# Patient Record
Sex: FEMALE | Race: WHITE
[De-identification: names, ages, dates, MRNs, and addresses within clinical notes are randomized per-mention and may not be internally consistent; named-entity substitution may affect disease eponyms.]

---

## 2019-12-05 ENCOUNTER — HOSPITAL ENCOUNTER (OUTPATIENT)
Dept: HOSPITAL 38 - CC.SDS | Age: 44
LOS: 1 days | Discharge: HOME | End: 2019-12-06
Payer: MEDICAID

## 2019-12-05 DIAGNOSIS — F41.9: ICD-10-CM

## 2019-12-05 DIAGNOSIS — F17.210: ICD-10-CM

## 2019-12-05 DIAGNOSIS — Z88.2: ICD-10-CM

## 2019-12-05 DIAGNOSIS — G43.909: ICD-10-CM

## 2019-12-05 DIAGNOSIS — E78.2: ICD-10-CM

## 2019-12-05 DIAGNOSIS — K43.9: Primary | ICD-10-CM

## 2019-12-05 DIAGNOSIS — Z79.899: ICD-10-CM

## 2019-12-05 DIAGNOSIS — F32.9: ICD-10-CM

## 2019-12-05 NOTE — OR
DATE OF OPERATION:  12/05/2019

 

PREOPERATIVE DIAGNOSIS:  VENTRAL HERNIA.

 

POSTOPERATIVE DIAGNOSIS:  VENTRAL HERNIA.

 

SURGEON:  Hay Andrews MD

 

PROCEDURE:  OPEN REPAIR OF VENTRAL HERNIA.

 

ANESTHESIA:  General.

 

ESTIMATED BLOOD LOSS:  Minimum.

 

SPECIMEN:  None.

 

FINDINGS:  A 3 cm ventral defect just superior to the umbilicus, consistent with

a ventral hernia.  The hernia sac contents contained omentum.

 

INDICATIONS:  This 44-year-old female has a symptomatic ventral hernia.

 

DESCRIPTION OF PROCEDURE:  After adequate preparation, a longitudinal incision

was made over the bulge area superior to the umbilicus.  This was carried down

to the hernia sac.  This was identified and the hernia sac dissected free from

the surrounding subcutaneous tissue.  I did have to incise the wound slightly

and free it up from the fascial margins to be able to reduce the omentum.  This

was able to be reduced without difficulty then.  The wound was closed in a

transverse fashion using double-stranded #1 PDS for buttress sutures and

permanent sutures were 0 prolene in a transverse fashion also.  This seemed to

adequately close this.  She does have thin tissue, but I decided not to use

mesh.  The muscular layer and linea alba were infiltrated with 0.5% lidocaine

with epinephrine.  The subcutaneous tissue was closed with a 2-0 Vicryl and a 4-

0 Vicryl for the skin.

 

BPB/MODL

DD:  12/05/2019 09:03:49

DT:  12/05/2019 11:28:48

Job #:  [id number]

## 2019-12-06 RX ADMIN — OXYCODONE HYDROCHLORIDE AND ACETAMINOPHEN PRN TAB: 5; 325 TABLET ORAL at 06:04

## 2019-12-06 RX ADMIN — OXYCODONE HYDROCHLORIDE AND ACETAMINOPHEN PRN TAB: 5; 325 TABLET ORAL at 01:52

## 2019-12-06 NOTE — PCM.SN
- Free Text/Narrative


Note: 





Stable POD #1.  Pain controlled.  Wound clean and dry.  Regular diet tolerated.

  Ambulated without assistance.  Instructions given.  MIRIAM Lacy as needed.  

Percocet #20 given for pain.  Can discharge today.

## 2020-07-19 ENCOUNTER — HOSPITAL ENCOUNTER (EMERGENCY)
Dept: HOSPITAL 38 - CC.ED | Age: 45
Discharge: HOME | End: 2020-07-19
Payer: MEDICAID

## 2020-07-19 DIAGNOSIS — G43.909: Primary | ICD-10-CM

## 2020-07-19 DIAGNOSIS — F17.210: ICD-10-CM

## 2020-07-19 DIAGNOSIS — F41.9: ICD-10-CM

## 2020-07-19 DIAGNOSIS — Z79.899: ICD-10-CM

## 2020-07-19 DIAGNOSIS — Z88.2: ICD-10-CM

## 2020-07-19 DIAGNOSIS — E78.00: ICD-10-CM

## 2020-07-19 NOTE — EDM.PDOC
ED HPI GENERAL MEDICAL PROBLEM





- General


Chief Complaint: General


Stated Complaint: migraine


Time Seen by Provider: 20 18:48


Source of Information: Reports: Patient


History Limitations: Reports: No Limitations





- History of Present Illness


INITIAL COMMENTS - FREE TEXT/NARRATIVE: 





Patient to the emergency department where she advises she has had a migraine for

the past hour and a half.  The patient advised that she is out of her normal 

migraine medicine.  The patient advises that she did take a Fioricet without 

relief of her symptoms.  Denies any change in vision she denies any ear, nose, 

throat symptoms denies any chest pain pressure heaviness denies any palpitations

irregular heartbeat she has had some nausea no vomiting no diarrhea denies any 

abdominal pain denies any calf pain redness or swelling.  The patient advises 

again that this is her normal progression of migraine.


Onset: Today


Duration: Hour(s):


Location: Reports: Head


Quality: Reports: Ache


Severity: Moderate


Improves with: Reports: None


Worsens with: Reports: None


Associated Symptoms: Reports: Headaches, Nausea/Vomiting (Nausea without 

vomiting).  Denies: Shortness of Breath


Treatments PTA: Reports: Other (see below) (Fioricet)


  ** Headache


Pain Score (Numeric/FACES): 8





- Related Data


                                    Allergies











Allergy/AdvReac Type Severity Reaction Status Date / Time


 


sulfamethoxazole Allergy  Renal Verified 20 18:39





[From Bactrim]   Insufficiency  


 


trimethoprim [From Bactrim] Allergy  Renal Verified 20 18:39





   Insufficiency  











Home Meds: 


                                    Home Meds





Butalb/Acetaminophen/Caffeine [Xahqql-Pvaeqyzh-Ehsg -40] 1 - 2 tab PO TID 

PRN 19 [History]


Cyclobenzaprine HCl 10 mg PO DAILY 19 [History]


LORazepam 1 mg PO DAILY 19 [History]


Nabumetone [Relafen Ds] 500 mg PO DAILY 19 [History]


Sertraline [Zoloft] 150 mg PO DAILY 19 [History]


Topiramate [Topamax] 100 mg PO DAILY 19 [History]


atorvaSTATin Calcium [Atorvastatin Calcium] 20 mg PO DAILY 19 [History]


busPIRone HCl [Buspirone HCl] 15 mg PO BID 19 [History]











Past Medical History


Cardiovascular History: Reports: High Cholesterol


Respiratory History: Reports: Other (See Below)


Other Respiratory History: SEASONAL ALLERGIES


Gastrointestinal History: Reports: Other (See Below)


Other Gastrointestinal History: UMBILICAL HERNIA


Genitourinary History: Reports: None


OB/GYN History: Reports: Pregnancy


Musculoskeletal History: Reports: Back Pain, Chronic


Neurological History: Reports: Migraines


Psychiatric History: Reports: Anxiety, Dementia





- Infectious Disease History


Infectious Disease History: Reports: Chicken Pox





- Past Surgical History


Cardiovascular Surgical History: Reports: None


Respiratory Surgical History: Reports: None


GI Surgical History: Reports: Cholecystectomy


Female  Surgical History: Reports:  Section


Neurological Surgical History: Reports: None


Musculoskeletal Surgical History: Reports: None





Social & Family History





- Family History


Family Medical History: Noncontributory





- Tobacco Use


Smoking Status *Q: Current Every Day Smoker


Years of Tobacco use: 30


Packs/Tins Daily: 1





- Caffeine Use


Caffeine Use: Reports: Coffee, Soda





- Recreational Drug Use


Recreational Drug Use: No





ED ROS GENERAL





- Review of Systems


Review Of Systems: See Below


Constitutional: Reports: No Symptoms


HEENT: Reports: No Symptoms


Respiratory: Reports: No Symptoms


Cardiovascular: Reports: No Symptoms.  Denies: Chest Pain, Claudication, 

Palpitations


Endocrine: Reports: No Symptoms


GI/Abdominal: Reports: No Symptoms, Nausea.  Denies: Abdominal Pain, Vomiting


: Reports: No Symptoms


Musculoskeletal: Reports: No Symptoms.  Denies: Neck Pain, Back Pain


Skin: Reports: No Symptoms.  Denies: Rash, Erythema


Neurological: Reports: Headache.  Denies: Confusion, Dizziness, Numbness, 

Paresthesia, Tingling, Trouble Speaking, Difficulty Walking, Weakness, Change in

 Speech, Gait Disturbance


Psychiatric: Reports: No Symptoms





ED EXAM, GENERAL





- Physical Exam


Exam: See Below


Exam Limited By: No Limitations


General Appearance: Alert, WD/WN, No Apparent Distress


Ears: Normal External Exam


Nose: Normal Inspection


Throat/Mouth: Normal Inspection, Normal Voice, No Airway Compromise


Head: Atraumatic, Normocephalic


Neck: Normal Inspection, Supple, Non-Tender, Full Range of Motion


Respiratory/Chest: No Respiratory Distress, Lungs Clear, Normal Breath Sounds, 

Chest Non-Tender


Cardiovascular: Normal Peripheral Pulses, Regular Rate, Rhythm, No Murmur


Peripheral Pulses: 2+: Radial (L), Radial (R)


GI/Abdominal: Soft, Non-Tender


Back Exam: Normal Inspection, Full Range of Motion


Extremities: Normal Inspection, Normal Range of Motion, Non-Tender, Normal 

Capillary Refill.  No: Leg Pain


Neurological: Alert, Oriented, CN II-XII Intact, Normal Cognition, Normal Gait, 

No Motor/Sensory Deficits


Psychiatric: Normal Affect, Normal Mood


Skin Exam: Warm, Dry, Intact, Normal Color





Course





- Vital Signs


Text/Narrative:: 





The patient was evaluated in the emergency department, the patient was given a 

typical migraine cocktail.  The patient has had some relief from this headache  

with the treatment plan.  The patient be discharged home she will be advised to 

refill her medication as she advises that she will be able to do this tomorrow. 

 She is advised to take the medication as prescribed.  She is advised to return 

emergency department sooner if worse or any problems she is also advised to 

follow-up the family doctor as needed


Last Recorded V/S: 


                                Last Vital Signs











Temp  37.0 C   20 18:38


 


Pulse  104 H  20 18:38


 


Resp  18   20 18:38


 


BP  130/71   20 18:38


 


Pulse Ox  99   20 18:38














- Orders/Labs/Meds


Meds: 


Medications














Discontinued Medications














Generic Name Dose Route Start Last Admin





  Trade Name Alex  PRN Reason Stop Dose Admin


 


Diphenhydramine HCl  25 mg  20 18:50  20 19:04





  Benadryl  IVUSH  20 18:51  25 mg





  ONETIME ONE   Administration


 


Ketorolac Tromethamine  30 mg  20 18:51  20 19:15





  Toradol  IVPUSH  20 18:52  30 mg





  ONETIME ONE   Administration


 


Metoclopramide HCl  10 mg  20 18:50  20 19:09





  Reglan  IVPUSH  20 18:51  10 mg





  ONETIME ONE   Administration














Departure





- Departure


Time of Disposition: 19:25


Disposition: Home, Self-Care 01


Condition: Good


Clinical Impression: 


Migraine headache


Qualifiers:


 Migraine type: unspecified Intractability: not intractable 








- Discharge Information


*PRESCRIPTION DRUG MONITORING PROGRAM REVIEWED*: Not Applicable


*COPY OF PRESCRIPTION DRUG MONITORING REPORT IN PATIENT MYRIAM: Not Applicable


Instructions:  Recurrent Migraine Headache, Easy-to-Read


Forms:  ED Department Discharge


Additional Instructions: 


 your medication from the pharmacy as discussed tomorrow


Take all your medication as prescribed


Follow-up with your family doctor as needed


Return to the emergency department as needed





Sepsis Event Note (ED)





- Evaluation


Sepsis Screening Result: No Definite Risk





- Focused Exam


Vital Signs: 


                                   Vital Signs











  Temp Pulse Resp BP Pulse Ox


 


 20 18:38  37.0 C  104 H  18  130/71  99














- Problem List & Annotations


(1) Migraine headache


SNOMED Code(s): 28270752


   Code(s): G43.909 - MIGRAINE, UNSP, NOT INTRACTABLE, WITHOUT STATUS 

MIGRAINOSUS   Status: Acute   Priority: Medium   Current Visit: No   


Qualifiers: 


   Migraine type: unspecified   Intractability: not intractable 





- Problem List Review


Problem List Initiated/Reviewed/Updated: Yes





- Assessment/Plan


Plan: 





The patient's past medical history, past surgical history, social history and 

past family medical history was reviewed see the nursing notes for complete 

details

## 2020-11-15 ENCOUNTER — HOSPITAL ENCOUNTER (EMERGENCY)
Dept: HOSPITAL 38 - CC.ED | Age: 45
Discharge: HOME | End: 2020-11-15
Payer: MEDICAID

## 2020-11-15 DIAGNOSIS — A08.4: ICD-10-CM

## 2020-11-15 DIAGNOSIS — E78.00: ICD-10-CM

## 2020-11-15 DIAGNOSIS — Z88.1: ICD-10-CM

## 2020-11-15 DIAGNOSIS — Z79.899: ICD-10-CM

## 2020-11-15 DIAGNOSIS — J01.10: Primary | ICD-10-CM

## 2020-11-15 DIAGNOSIS — Z88.2: ICD-10-CM

## 2020-11-15 DIAGNOSIS — F41.9: ICD-10-CM

## 2020-11-15 DIAGNOSIS — F17.210: ICD-10-CM

## 2020-11-15 DIAGNOSIS — F32.9: ICD-10-CM

## 2020-11-15 DIAGNOSIS — B37.9: ICD-10-CM

## 2020-11-15 DIAGNOSIS — Z20.828: ICD-10-CM

## 2020-11-15 LAB
APTT PPP: 23.2 SEC (ref 23.2–32.3)
CHLORIDE SERPL-SCNC: 102 MEQ/L (ref 98–106)
SODIUM SERPL-SCNC: 138 MEQ/L (ref 136–145)

## 2020-11-15 PROCEDURE — U0002 COVID-19 LAB TEST NON-CDC: HCPCS

## 2020-11-15 NOTE — EDM.PDOC
ED HPI GENERAL MEDICAL PROBLEM





- General


Chief Complaint: Gastrointestinal Problem


Stated Complaint: vomiting


Time Seen by Provider: 11/15/20 14:58


Source of Information: Reports: Patient


History Limitations: Reports: No Limitations





- History of Present Illness


INITIAL COMMENTS - FREE TEXT/NARRATIVE: 





This patient is a 45 year old female that presents to the ER. Patient reports 

that for about 3 weeks having a productive cough. She reports that she also has 

had frontal headache on and off again for 3 weeks. Patient reports that it feels

like a sinus headache with pressure in her sinus. Patient reports then yesterday

she started with nausea and vomiting. She reports she vomiting a lot yesterday. 

She reports today she has vomited 3 times. She reports that she is still a lit

tle nauseated, but is feeling a little better today from the vomiting. She 

reports she was going to go to work today, but was told she needed a doctors 

note to return to work, so this is reason for coming to the ER today. 


Onset Date: 20


Duration: Day(s): (2)


Severity: Mild


Improves with: Reports: None


Worsens with: Reports: None


Associated Symptoms: Reports: Confusion, Chest Pain, Cough, cough w sputum, 

Headaches, Nausea/Vomiting.  Denies: Diaphoresis, Fever/Chills, Loss of 

Appetite, Malaise, Rash, Seizure, Shortness of Breath, Syncope, Weakness


  ** Chest


Pain Score (Numeric/FACES): 3





- Related Data


                                    Allergies











Allergy/AdvReac Type Severity Reaction Status Date / Time


 


sulfamethoxazole Allergy  Renal Verified 11/15/20 14:34





[From Bactrim]   Insufficiency  


 


trimethoprim [From Bactrim] Allergy  Renal Verified 11/15/20 14:34





   Insufficiency  











Home Meds: 


                                    Home Meds





Butalb/Acetaminophen/Caffeine [Ocshtj-Jdufmqdb-Yvxd -40] 1 - 2 tab PO TID 

PRN 19 [History]


Cyclobenzaprine HCl 10 mg PO DAILY 19 [History]


LORazepam 1 mg PO DAILY PRN 19 [History]


Sertraline [Zoloft] 150 mg PO DAILY 19 [History]


Topiramate [Topamax] 100 mg PO DAILY 19 [History]


atorvaSTATin Calcium [Atorvastatin Calcium] 40 mg PO DAILY 19 [History]


busPIRone HCl [Buspirone HCl] 15 mg PO BID 19 [History]


Cefdinir [Omnicef] 300 mg PO BID 10 Days #20 cap 11/15/20 [Rx]


Fluconazole [Diflucan] 150 mg PO Q72H #2 tablet 11/15/20 [Rx]


Ondansetron [Zofran ODT] 4 mg PO Q6H PRN #12 tab.dis 11/15/20 [Rx]











Past Medical History


Cardiovascular History: Reports: High Cholesterol


Respiratory History: Reports: Other (See Below)


Other Respiratory History: SEASONAL ALLERGIES


Gastrointestinal History: Reports: Other (See Below)


Other Gastrointestinal History: UMBILICAL HERNIA


Genitourinary History: Reports: None


OB/GYN History: Reports: Pregnancy


Musculoskeletal History: Reports: Back Pain, Chronic


Neurological History: Reports: Migraines


Psychiatric History: Reports: Anxiety, Depression





- Infectious Disease History


Infectious Disease History: Reports: Chicken Pox





- Past Surgical History


Cardiovascular Surgical History: Reports: None


Respiratory Surgical History: Reports: None


GI Surgical History: Reports: Cholecystectomy, Hernia Repair/Other


Female  Surgical History: Reports:  Section


Neurological Surgical History: Reports: None


Musculoskeletal Surgical History: Reports: None





Social & Family History





- Family History


Family Medical History: No Pertinent Family History





- Tobacco Use


Tobacco Use Status *Q: Current Every Day Tobacco User


Years of Tobacco use: 30


Packs/Tins Daily: 0.5





- Caffeine Use


Caffeine Use: Reports: Coffee, Energy Drinks, Soda





- Recreational Drug Use


Recreational Drug Use: No





ED ROS GENERAL





- Review of Systems


Review Of Systems: See Below


Constitutional: Reports: No Symptoms


HEENT: Reports: Rhinitis, Sinus Problem


Respiratory: Reports: Pleuritic Chest Pain (pain when coughing and taking big 

deep breaths), Cough, Sputum.  Denies: Shortness of Breath


Cardiovascular: Denies: Chest Pain (pain is with taking big deep breaths and 

coughing. )


Endocrine: Reports: No Symptoms


GI/Abdominal: Reports: Nausea, Vomiting.  Denies: Abdominal Pain, Constipation, 

Diarrhea


: Reports: No Symptoms


Musculoskeletal: Reports: No Symptoms


Skin: Reports: No Symptoms


Neurological: Reports: Headache (frontal. Sinus Pressure)


Psychiatric: Reports: No Symptoms


Hematologic/Lymphatic: Reports: No Symptoms


Immunologic: Reports: No Symptoms





ED EXAM, GENERAL





- Physical Exam


Exam: See Below


Exam Limited By: No Limitations


General Appearance: Alert, WD/WN, No Apparent Distress


Eye Exam: Bilateral Eye: Normal Inspection, PERRL


Ears: Normal External Exam, Normal Canal, Hearing Grossly Normal, Normal TMs


Ear Exam: Bilateral Ear: Auricle Normal, Canal Normal, TM normal


Nose: Normal Inspection, Normal Mucosa, No Blood


Throat/Mouth: Normal Inspection, Normal Lips, Normal Teeth, Normal Gums, Normal 

Oropharynx, Normal Voice, No Airway Compromise


Head: Atraumatic, Normocephalic, Sinus Tenderness (frontal), Other (Frontal 

sinus failed light illumination)


Neck: Normal Inspection, Supple, Non-Tender, Full Range of Motion


Respiratory/Chest: No Respiratory Distress, Lungs Clear, Normal Breath Sounds, 

No Accessory Muscle Use, Chest Non-Tender


Cardiovascular: Normal Peripheral Pulses, Regular Rate, Rhythm, No Edema, No 

Gallop, No JVD, No Murmur, No Rub


Peripheral Pulses: 2+: Radial (L), Radial (R), Posterior Tibial (L), Posterior 

Tibial (R)


GI/Abdominal: Normal Bowel Sounds, Soft, Non-Tender, No Organomegaly, No 

Distention, No Abnormal Bruit, No Mass, Pelvis Stable


Back Exam: Normal Inspection, Full Range of Motion.  No: CVA Tenderness (L), CVA

 Tenderness (R)


Extremities: Normal Inspection, Normal Range of Motion, Non-Tender, No Pedal 

Edema, Normal Capillary Refill


Neurological: Alert, Oriented, Normal Cognition, Normal Gait, No Motor/Sensory 

Deficits


Psychiatric: Normal Affect, Normal Mood


Skin Exam: Warm, Dry, Intact, Normal Color, No Rash


Lymphatic: No Adenopathy


  ** #1 Interpretation


EKG Date: 11/15/20


Time: 14:54


Rhythm: NSR


Rate (Beats/Min): 96


Axis: Normal


P-Wave: Present


QRS: Normal


ST-T: Normal


QT: Normal


Comparison: NA - No Prior EKG





Course





- Vital Signs


Last Recorded V/S: 


                                Last Vital Signs











Temp  98 F   11/15/20 14:30


 


Pulse  102 H  11/15/20 14:30


 


Resp  18   11/15/20 14:30


 


BP  157/89 H  11/15/20 14:30


 


Pulse Ox  98   11/15/20 14:30














- Orders/Labs/Meds


Orders: 


                               Active Orders 24 hr











 Category Date Time Status


 


 EKG Documentation Completion [RC] STAT Care  11/15/20 14:43 Active


 


 Chest 2V [CR] Stat Exams  11/15/20 14:43 Taken











Labs: 


                                Laboratory Tests











  11/15/20 11/15/20 11/15/20 Range/Units





  14:43 14:45 14:45 


 


WBC   10.8 H   (5.0-10.0)  10^3/uL


 


RBC   5.07   (4.00-5.50)  10^6/uL


 


Hgb   15.6   (12.0-16.0)  g/dL


 


Hct   48.6 H   (37.0-47.0)  %


 


MCV   95.9 H   (82.0-94.0)  fL


 


MCH   30.8   (27.0-32.0)  pg


 


MCHC   32.1 L   (33.0-38.0)  g/dL


 


RDW Coeff of Jocy   15.0   (11.0-15.0)  %


 


Plt Count   305   (150-400)  10^3/uL


 


Neut % (Auto)   65.1   (35-85)  %


 


Lymph % (Auto)   26.1   (10-55)  %


 


Mono % (Auto)   5.2   (0-16)  %


 


Eos % (Auto)   3.0   (0-5)  %


 


Baso % (Auto)   0.6   (0-3)  %


 


Neut # (Auto)   7.05 H   (1.80-7.00)  10^3/uL


 


Lymph # (Auto)   2.82   (1.00-4.80)  10^3/uL


 


Mono # (Auto)   0.56   (0.00-0.80)  10^3/uL


 


Eos # (Auto)   0.32   (0.00-0.45)  10^3/uL


 


Baso # (Auto)   0.06   10^3/uL


 


PT    10.0  (9.7-12.3)  SEC


 


INR    0.99  (0.92-1.18)  


 


APTT    23.2  (23.2-32.3)  SEC


 


Sodium     (136-145)  mEq/L


 


Potassium     (3.5-5.0)  mEq/L


 


Chloride     ()  mEq/L


 


Carbon Dioxide     (21-32)  mmol/L


 


BUN     (7-18)  mg/dL


 


Creatinine     (0.6-1.0)  mg/dL


 


Est Cr Clr Drug Dosing     mL/min


 


Estimated GFR (MDRD)     (>=60)  mL/min


 


Glucose     (75-99)  mg/dL


 


Lactic Acid     (0.4-2.0)  mmol/L


 


Calcium     (8.4-10.1)  mg/dL


 


Total Bilirubin     (0.0-1.0)  mg/dL


 


AST     (15-37)  U/L


 


ALT     (12-78)  U/L


 


Alkaline Phosphatase     ()  U/L


 


Lactate Dehydrogenase     (100-190)  U/L


 


Creatine Kinase     ()  U/L


 


Troponin I     (0.00-0.06)  ng/mL


 


NT-Pro-B Natriuret Pep     (0-1000)  pg/mL


 


Total Protein     (6.4-8.2)  g/dL


 


Albumin     (3.4-5.0)  g/dL


 


Amylase     ()  U/L


 


Lipase     ()  U/L


 


Urine Color     (YELLOW)  


 


Urine Appearance     (CLEAR)  


 


Urine pH     (4.5-8.0)  


 


Ur Specific Gravity     (1.003-1.020)  


 


Urine Protein     (NEGATIVE)  mg/dL


 


Urine Glucose (UA)     (NEGATIVE)  mg/dL


 


Urine Ketones     (NEGATIVE)  mg/dL


 


Urine Occult Blood     (NEGATIVE)  


 


Urine Nitrite     (NEGATIVE)  


 


Urine Bilirubin     (NEGATIVE)  


 


Urine Urobilinogen     (0.2-1.0)  EU/dL


 


Ur Leukocyte Esterase     (NEGATIVE)  


 


Urine RBC     (0-5)  /HPF


 


Urine WBC     (0-5)  /HPF


 


Ur Epithelial Cells     (NOT SEEN)  /HPF


 


Urine Bacteria     (NOT SEEN)  /HPF


 


Urine Mucus     (NOT SEEN)  /HPF


 


Urine Yeast     (NOT SEEN)  /HPF


 


Urinalysis Comment     


 


SARS CoV-2 RNA Rapid EVELINA  Negative    (NEGATIVE)  














  11/15/20 11/15/20 11/15/20 Range/Units





  14:45 14:45 15:09 


 


WBC     (5.0-10.0)  10^3/uL


 


RBC     (4.00-5.50)  10^6/uL


 


Hgb     (12.0-16.0)  g/dL


 


Hct     (37.0-47.0)  %


 


MCV     (82.0-94.0)  fL


 


MCH     (27.0-32.0)  pg


 


MCHC     (33.0-38.0)  g/dL


 


RDW Coeff of Jocy     (11.0-15.0)  %


 


Plt Count     (150-400)  10^3/uL


 


Neut % (Auto)     (35-85)  %


 


Lymph % (Auto)     (10-55)  %


 


Mono % (Auto)     (0-16)  %


 


Eos % (Auto)     (0-5)  %


 


Baso % (Auto)     (0-3)  %


 


Neut # (Auto)     (1.80-7.00)  10^3/uL


 


Lymph # (Auto)     (1.00-4.80)  10^3/uL


 


Mono # (Auto)     (0.00-0.80)  10^3/uL


 


Eos # (Auto)     (0.00-0.45)  10^3/uL


 


Baso # (Auto)     10^3/uL


 


PT     (9.7-12.3)  SEC


 


INR     (0.92-1.18)  


 


APTT     (23.2-32.3)  SEC


 


Sodium  138    (136-145)  mEq/L


 


Potassium  3.8    (3.5-5.0)  mEq/L


 


Chloride  102    ()  mEq/L


 


Carbon Dioxide  25    (21-32)  mmol/L


 


BUN  16    (7-18)  mg/dL


 


Creatinine  0.7    (0.6-1.0)  mg/dL


 


Est Cr Clr Drug Dosing  103.83    mL/min


 


Estimated GFR (MDRD)  > 60    (>=60)  mL/min


 


Glucose  134 H D    (75-99)  mg/dL


 


Lactic Acid   1.9   (0.4-2.0)  mmol/L


 


Calcium  8.4    (8.4-10.1)  mg/dL


 


Total Bilirubin  0.3    (0.0-1.0)  mg/dL


 


AST  13 L    (15-37)  U/L


 


ALT  28    (12-78)  U/L


 


Alkaline Phosphatase  109    ()  U/L


 


Lactate Dehydrogenase  172    (100-190)  U/L


 


Creatine Kinase  42    ()  U/L


 


Troponin I  < 0.017    (0.00-0.06)  ng/mL


 


NT-Pro-B Natriuret Pep  66    (0-1000)  pg/mL


 


Total Protein  7.5    (6.4-8.2)  g/dL


 


Albumin  3.3 L    (3.4-5.0)  g/dL


 


Amylase  62    ()  U/L


 


Lipase  190    ()  U/L


 


Urine Color    [place]  (YELLOW)  


 


Urine Appearance    Slightly cloudy  (CLEAR)  


 


Urine pH    5.5  (4.5-8.0)  


 


Ur Specific Gravity    >= 1.030 H  (1.003-1.020)  


 


Urine Protein    Trace H  (NEGATIVE)  mg/dL


 


Urine Glucose (UA)    Negative  (NEGATIVE)  mg/dL


 


Urine Ketones    Negative  (NEGATIVE)  mg/dL


 


Urine Occult Blood    Negative  (NEGATIVE)  


 


Urine Nitrite    Negative  (NEGATIVE)  


 


Urine Bilirubin    Negative  (NEGATIVE)  


 


Urine Urobilinogen    0.2  (0.2-1.0)  EU/dL


 


Ur Leukocyte Esterase    Negative  (NEGATIVE)  


 


Urine RBC    Not seen  (0-5)  /HPF


 


Urine WBC    0-5  (0-5)  /HPF


 


Ur Epithelial Cells    Moderate H  (NOT SEEN)  /HPF


 


Urine Bacteria    Moderate H  (NOT SEEN)  /HPF


 


Urine Mucus    Many H  (NOT SEEN)  /HPF


 


Urine Yeast    Few H  (NOT SEEN)  /HPF


 


Urinalysis Comment    See note  


 


SARS CoV-2 RNA Rapid EVELINA     (NEGATIVE)  











Meds: 


Medications














Discontinued Medications














Generic Name Dose Route Start Last Admin





  Trade Name Freq  PRN Reason Stop Dose Admin


 


Ceftriaxone Sodium  1 gm  11/15/20 16:01 





  Rocephin  IM  11/15/20 16:02 





  ONETIME ONE  


 


Lidocaine HCl  1 ml  11/15/20 16:01 





  Xylocaine-Mpf 1%  INJECT  11/15/20 16:02 





  NOW STA  


 


Ondansetron HCl  4 mg  11/15/20 15:08  11/15/20 15:12





  Zofran Odt  PO  11/15/20 15:09  4 mg





  ONETIME ONE   Administration


 


Ondansetron HCl  3 packet  11/15/20 15:29  11/15/20 15:38





  Take Home: Ondansetron Odt 4 Mg, 2 Tab Pack  PO  11/15/20 15:30  2 packet





  ONETIME ONE   Administration














- Radiology Interpretation


Free Text/Narrative:: 





CXR: no acute findings





Departure





- Departure


Time of Disposition: 15:57


Disposition: Home, Self-Care 01


Condition: Fair


Clinical Impression: 


 Viral enteritis, Yeast infection





Acute sinusitis


Qualifiers:


 Sinusitis location: frontal Recurrence: non-recurrent Qualified Code(s): J01.10

 - Acute frontal sinusitis, unspecified








- Discharge Information


*PRESCRIPTION DRUG MONITORING PROGRAM REVIEWED*: Not Applicable


*COPY OF PRESCRIPTION DRUG MONITORING REPORT IN PATIENT MYRIAM: Not Applicable


Prescriptions: 


Fluconazole [Diflucan] 150 mg PO Q72H #2 tablet


Cefdinir [Omnicef] 300 mg PO BID 10 Days #20 cap


Ondansetron [Zofran ODT] 4 mg PO Q6H PRN #12 tab.dis


 PRN Reason: Nausea/Vomiting


Instructions:  Viral Gastroenteritis, Adult, Sinusitis, Adult, Easy-to-Read


Referrals: 


PCP,None [Primary Care Provider] - 


Forms:  ED Department Discharge


Additional Instructions: 


Followup with your primary care provider this week


Return to the ER for worsening of condition or any emergent concerns


Increase fluid intake


Zofran 4mg 1 pill under the tongue every 4 hours as needed for nausea/vomiting 

#4 take home: #12 sent to pharmacy


Cefdinir 300mg 1 pill twice a day for 10 days #20 no refill Sent to pharmacy


Diflucan 150mg 1 pill day one, then 1 pill day 3 #2 no refill Sent to pharmacy











Sepsis Event Note (ED)





- Evaluation


Sepsis Screening Result: No Definite Risk





- Focused Exam


Vital Signs: 


                                   Vital Signs











  Temp Pulse Resp BP Pulse Ox


 


 11/15/20 14:30  98 F  102 H  18  157/89 H  98














- My Orders


Last 24 Hours: 


My Active Orders





11/15/20 14:43


EKG Documentation Completion [RC] STAT 


Chest 2V [CR] Stat 














- Assessment/Plan


Last 24 Hours: 


My Active Orders





11/15/20 14:43


EKG Documentation Completion [RC] STAT 


Chest 2V [CR] Stat 











Plan: 





PLEASE SEE RN NOTE FOR PFSH

## 2021-02-13 ENCOUNTER — HOSPITAL ENCOUNTER (EMERGENCY)
Dept: HOSPITAL 38 - CC.ED | Age: 46
Discharge: HOME | End: 2021-02-13
Payer: COMMERCIAL

## 2021-02-13 DIAGNOSIS — Z79.899: ICD-10-CM

## 2021-02-13 DIAGNOSIS — R10.31: Primary | ICD-10-CM

## 2021-02-13 DIAGNOSIS — Z88.1: ICD-10-CM

## 2021-02-13 DIAGNOSIS — Z88.2: ICD-10-CM

## 2021-02-13 DIAGNOSIS — E78.00: ICD-10-CM

## 2021-02-13 DIAGNOSIS — Z72.0: ICD-10-CM

## 2021-02-13 LAB
CHLORIDE SERPL-SCNC: 108 MEQ/L (ref 98–106)
SODIUM SERPL-SCNC: 146 MEQ/L (ref 136–145)

## 2021-02-13 NOTE — EDM.PDOC
ED HPI GENERAL MEDICAL PROBLEM





- General


Chief Complaint: Abdominal Pain


Stated Complaint: RLQ abd pain


Time Seen by Provider: 21 12:33


Source of Information: Reports: Patient


History Limitations: Reports: No Limitations





- History of Present Illness


INITIAL COMMENTS - FREE TEXT/NARRATIVE: 





This patient is a 45 year old female that presents to the ER. Patient reports 

that since 8 days ago having RLQ abdominal pain. She reports the pain comes and 

goes. She reports the pain is sharp in nature. She reports that she was seen in 

clinic by PCP on Tuesday, sent to [facility] to rule out appendix by US. Patent 

reports the US showed possible diverticulitis but no appendicitis. She reports 

she was put on Hydrocodone and abx. She reports the Hydrocodone was helping, but

she is now out and now her abd pain is back again. She reports she was working 

today and her abdominal pan was sharp and severe to the RLQ, she bent over in 

pain. Patient reports that she also has lower back pain for several months, and 

has been also having tingling and numbness down her both legs. She reports this 

pain does not feel like that. She also reports her lower back doese not hurt 

right now and she is not having any numbness down her legs now. She reports that

she does not have fever, chest pain, shortness of breath, back pain, flank pain,

urinary/bowel changes, nausea, vomiting, diarrhea. She reports history of having

gallbladder removed and history of ovarian cysts. 


Onset Date: 21


Duration: Day(s): (8)


Location: Reports: Abdomen


Quality: Reports: Sharp


Severity: Moderate


Improves with: Reports: None


Worsens with: Reports: None


Associated Symptoms: Denies: Confusion, Chest Pain, Cough, cough w sputum, 

Diaphoresis, Fever/Chills, Headaches, Loss of Appetite, Malaise, Nausea/Vo

miting, Rash, Seizure, Shortness of Breath, Syncope, Weakness


  ** Right Lower Abdomen


Pain Score (Numeric/FACES): 9





- Related Data


                                    Allergies











Allergy/AdvReac Type Severity Reaction Status Date / Time


 


sulfamethoxazole Allergy  Renal Verified 21 12:14





[From Bactrim]   Insufficiency  


 


trimethoprim [From Bactrim] Allergy  Renal Verified 21 12:14





   Insufficiency  











Home Meds: 


                                    Home Meds





Butalb/Acetaminophen/Caffeine [Xbbkyj-Aclpqkon-Losl -40] 1 - 2 tab PO TID 

PRN 19 [History]


Cyclobenzaprine HCl 10 mg PO DAILY 19 [History]


LORazepam 1 mg PO DAILY PRN 19 [History]


Sertraline [Zoloft] 150 mg PO DAILY 19 [History]


Topiramate [Topamax] 100 mg PO DAILY 19 [History]


atorvaSTATin Calcium [Atorvastatin Calcium] 40 mg PO DAILY 19 [History]


busPIRone HCl [Buspirone HCl] 15 mg PO BID 19 [History]











Past Medical History


Cardiovascular History: Reports: High Cholesterol


Respiratory History: Reports: Other (See Below)


Other Respiratory History: SEASONAL ALLERGIES


Gastrointestinal History: Reports: Other (See Below)


Other Gastrointestinal History: UMBILICAL HERNIA


Genitourinary History: Reports: None


OB/GYN History: Reports: Polycystic Ovaries, Pregnancy


Musculoskeletal History: Reports: Back Pain, Chronic


Neurological History: Reports: Migraines


Psychiatric History: Reports: Anxiety, Depression





- Infectious Disease History


Infectious Disease History: Reports: Chicken Pox





- Past Surgical History


Cardiovascular Surgical History: Reports: None


Respiratory Surgical History: Reports: None


GI Surgical History: Reports: Cholecystectomy, Hernia Repair/Other


Female  Surgical History: Reports:  Section


Neurological Surgical History: Reports: None


Musculoskeletal Surgical History: Reports: None





Social & Family History





- Family History


Family Medical History: No Pertinent Family History





- Tobacco Use


Tobacco Use Status *Q: Current Every Day Tobacco User


Years of Tobacco use: 35


Packs/Tins Daily: 0.5





- Caffeine Use


Caffeine Use: Reports: Coffee, Energy Drinks, Tea





- Recreational Drug Use


Recreational Drug Use: No





ED ROS GENERAL





- Review of Systems


Review Of Systems: See Below


Constitutional: Reports: No Symptoms


HEENT: Reports: Sinus Problem (mild congestion).  Denies: Dental Pain, Ear 

Discharge, Ear Pain, Nosebleed, Throat Pain, Throat Swelling


Respiratory: Reports: Cough.  Denies: Shortness of Breath, Wheezing, Pleuritic 

Chest Pain, Sputum, Hemoptysis


Cardiovascular: Reports: No Symptoms


Endocrine: Reports: No Symptoms


GI/Abdominal: Reports: Abdominal Pain (RLQ).  Denies: Black Stool, Bloody Stool,

 Constipation, Diarrhea, Decreased Appetite, Difficulty Swallowing, Distension, 

Nausea, Vomiting


: Denies: Discharge, Dysuria, Flank Pain, Frequency, Hematuria, Incontinence, 

Irregular Menses, Pain, Urgency, Urinary Retention


Musculoskeletal: Reports: No Symptoms


Skin: Reports: No Symptoms


Neurological: Reports: No Symptoms


Psychiatric: Reports: No Symptoms


Immunologic: Reports: No Symptoms





ED EXAM, GI/ABD





- Physical Exam


Exam: See Below


Exam Limited By: No Limitations


General Appearance: Alert, WD/WN, No Apparent Distress


Eyes: Bilateral: Normal Appearance


Ears: Normal External Exam, Normal Canal, Hearing Grossly Normal, Normal TMs


Nose: Normal Inspection, Normal Mucosa, No Blood


Throat/Mouth: Normal Inspection, Normal Lips, Normal Teeth, Normal Gums, Normal 

Oropharynx, Normal Voice, No Airway Compromise


Head: Atraumatic, Normocephalic


Neck: Normal Inspection, Supple, Non-Tender, Full Range of Motion


Respiratory/Chest: No Respiratory Distress, Lungs Clear, Normal Breath Sounds, 

No Accessory Muscle Use


Cardiovascular: Normal Peripheral Pulses, Regular Rate, Rhythm, No Edema, No 

Gallop, No JVD, No Murmur, No Rub


GI/Abdominal Exam: Normal Bowel Sounds, Soft, No Distention, Tender (RLQ), Other

 (obesity makes exam difficult, unabe to identify landmarks. ).  No: Guarding, 

Rigid, Rebound, Abnormal Bowel Sounds


 (Female) Exam: Deferred


Rectal (Female) Exam: Deferred


Back Exam: Normal Inspection, Full Range of Motion, Decreased Range of Motion, 

Muscle Spasm, Paraspinal Tenderness, Vertebral Tenderness.  No: CVA Tenderness 

(L), CVA Tenderness (R)


Extremities: Normal Inspection, Normal Range of Motion, Non-Tender, No Pedal 

Edema, Normal Capillary Refill


Neurological: Alert, Oriented, Normal Cognition, Normal Gait, No Motor/Sensory 

Deficits


Psychiatric: Normal Affect, Normal Mood


Skin Exam: Warm, Dry, Intact, Normal Color, No Rash


Lymphatic: No Adenopathy





Course





- Vital Signs


Last Recorded V/S: 


                                Last Vital Signs











Temp  98.2 F   21 12:13


 


Pulse  89   21 12:13


 


Resp  18   21 12:13


 


BP  109/85   21 12:13


 


Pulse Ox  98   21 12:13














- Orders/Labs/Meds


Orders: 


                               Active Orders 24 hr











 Category Date Time Status


 


 Abdomen Pelvis w Cont [CT] Stat Exams  21 12:41 Taken











Labs: 


                                Laboratory Tests











  02/21 Range/Units





  12:20 12:20 12:32 


 


WBC    9.6  (5.0-10.0)  10^3/uL


 


RBC    4.54  (4.00-5.50)  10^6/uL


 


Hgb    14.0  (12.0-16.0)  g/dL


 


Hct    43.0  (37.0-47.0)  %


 


MCV    94.7 H  (82.0-94.0)  fL


 


MCH    30.8  (27.0-32.0)  pg


 


MCHC    32.6 L  (33.0-38.0)  g/dL


 


RDW Coeff of Jocy    14.5  (11.0-15.0)  %


 


Plt Count    294  (150-400)  10^3/uL


 


Neut % (Auto)    62.3  (35-85)  %


 


Lymph % (Auto)    28.9  (10-55)  %


 


Mono % (Auto)    5.3  (0-16)  %


 


Eos % (Auto)    3.1  (0-5)  %


 


Baso % (Auto)    0.4  (0-3)  %


 


Neut # (Auto)    5.95  (1.80-7.00)  10^3/uL


 


Lymph # (Auto)    2.76  (1.00-4.80)  10^3/uL


 


Mono # (Auto)    0.51  (0.00-0.80)  10^3/uL


 


Eos # (Auto)    0.30  (0.00-0.45)  10^3/uL


 


Baso # (Auto)    0.04  10^3/uL


 


Sodium     (136-145)  mEq/L


 


Potassium     (3.5-5.0)  mEq/L


 


Chloride     ()  mEq/L


 


Carbon Dioxide     (21-32)  mmol/L


 


BUN     (7-18)  mg/dL


 


Creatinine     (0.6-1.0)  mg/dL


 


Est Cr Clr Drug Dosing     mL/min


 


Estimated GFR (MDRD)     (>=60)  mL/min


 


Glucose     (75-99)  mg/dL


 


Calcium     (8.4-10.1)  mg/dL


 


Total Bilirubin     (0.0-1.0)  mg/dL


 


AST     (15-37)  U/L


 


ALT     (12-78)  U/L


 


Alkaline Phosphatase     ()  U/L


 


C-Reactive Protein     (0.2-0.8)  mg/dL


 


Total Protein     (6.4-8.2)  g/dL


 


Albumin     (3.4-5.0)  g/dL


 


Amylase     ()  U/L


 


Lipase     ()  U/L


 


Urine Color  Yellow    (YELLOW)  


 


Urine Appearance  Clear    (CLEAR)  


 


Urine pH  5.5    (4.5-8.0)  


 


Ur Specific Gravity  >= 1.030 H    (1.003-1.020)  


 


Urine Protein  Negative    (NEGATIVE)  mg/dL


 


Urine Glucose (UA)  Negative    (NEGATIVE)  mg/dL


 


Urine Ketones  Negative    (NEGATIVE)  mg/dL


 


Urine Occult Blood  Negative    (NEGATIVE)  


 


Urine Nitrite  Negative    (NEGATIVE)  


 


Urine Bilirubin  Negative    (NEGATIVE)  


 


Urine Urobilinogen  0.2    (0.2-1.0)  EU/dL


 


Ur Leukocyte Esterase  Negative    (NEGATIVE)  


 


Urine RBC  Not seen    (0-5)  /HPF


 


Urine WBC  Not seen    (0-5)  /HPF


 


Urine HCG, Qual   Negative   














  21 Range/Units





  12:32 


 


WBC   (5.0-10.0)  10^3/uL


 


RBC   (4.00-5.50)  10^6/uL


 


Hgb   (12.0-16.0)  g/dL


 


Hct   (37.0-47.0)  %


 


MCV   (82.0-94.0)  fL


 


MCH   (27.0-32.0)  pg


 


MCHC   (33.0-38.0)  g/dL


 


RDW Coeff of Jocy   (11.0-15.0)  %


 


Plt Count   (150-400)  10^3/uL


 


Neut % (Auto)   (35-85)  %


 


Lymph % (Auto)   (10-55)  %


 


Mono % (Auto)   (0-16)  %


 


Eos % (Auto)   (0-5)  %


 


Baso % (Auto)   (0-3)  %


 


Neut # (Auto)   (1.80-7.00)  10^3/uL


 


Lymph # (Auto)   (1.00-4.80)  10^3/uL


 


Mono # (Auto)   (0.00-0.80)  10^3/uL


 


Eos # (Auto)   (0.00-0.45)  10^3/uL


 


Baso # (Auto)   10^3/uL


 


Sodium  146 H  (136-145)  mEq/L


 


Potassium  3.9  (3.5-5.0)  mEq/L


 


Chloride  108 H  ()  mEq/L


 


Carbon Dioxide  25  (21-32)  mmol/L


 


BUN  17  D  (7-18)  mg/dL


 


Creatinine  0.8  (0.6-1.0)  mg/dL


 


Est Cr Clr Drug Dosing  87.70  mL/min


 


Estimated GFR (MDRD)  > 60  (>=60)  mL/min


 


Glucose  125 H D  (75-99)  mg/dL


 


Calcium  9.0  (8.4-10.1)  mg/dL


 


Total Bilirubin  0.1  (0.0-1.0)  mg/dL


 


AST  24  (15-37)  U/L


 


ALT  31  (12-78)  U/L


 


Alkaline Phosphatase  128 H  ()  U/L


 


C-Reactive Protein  1.9 H  (0.2-0.8)  mg/dL


 


Total Protein  7.2  (6.4-8.2)  g/dL


 


Albumin  3.3 L  (3.4-5.0)  g/dL


 


Amylase  38  ()  U/L


 


Lipase  112  ()  U/L


 


Urine Color   (YELLOW)  


 


Urine Appearance   (CLEAR)  


 


Urine pH   (4.5-8.0)  


 


Ur Specific Gravity   (1.003-1.020)  


 


Urine Protein   (NEGATIVE)  mg/dL


 


Urine Glucose (UA)   (NEGATIVE)  mg/dL


 


Urine Ketones   (NEGATIVE)  mg/dL


 


Urine Occult Blood   (NEGATIVE)  


 


Urine Nitrite   (NEGATIVE)  


 


Urine Bilirubin   (NEGATIVE)  


 


Urine Urobilinogen   (0.2-1.0)  EU/dL


 


Ur Leukocyte Esterase   (NEGATIVE)  


 


Urine RBC   (0-5)  /HPF


 


Urine WBC   (0-5)  /HPF


 


Urine HCG, Qual   











Meds: 


Medications














Discontinued Medications














Generic Name Dose Route Start Last Admin





  Trade Name Freq  PRN Reason Stop Dose Admin


 


Hydrocodone Bitart/Acetaminophen  3 packet  21 14:21 





  Take Home: Acetaminophen/Hydrocod, 2 Tab Pack  PO  21 14:22 





  ONETIME ONE  


 


Sodium Chloride  1,000 mls @ 1,000 mls/hr  21 12:40  21 12:49





  Normal Saline  IV  21 13:39  1,000 mls/hr





  .BOLUS ONE   Administration


 


Iopamidol  100 ml  21 13:02  21 13:16





  Isovue-370 (76%)  IVPUSH  21 13:03  100 ml





  ONETIME ONE   Administration


 


Ketorolac Tromethamine  30 mg  21 13:12  21 13:43





  Toradol  IVPUSH  21 13:13  30 mg





  ONETIME ONE   Administration


 


Ketorolac Tromethamine  3 packet  21 14:21 





  Take Home: Ketorolac 10 Mg, 4 Tab Pack  PO  21 14:22 





  ONETIME ONE  


 


Morphine Sulfate  4 mg  21 12:40  21 12:49





  Morphine  IVPUSH  21 12:41  4 mg





  ONETIME ONE   Administration


 


Ondansetron HCl  4 mg  21 12:40  21 12:49





  Zofran  IVPUSH  21 12:41  4 mg





  NOW STA   Administration














- Radiology Interpretation


Free Text/Narrative:: 





CT ABD/PELVIS with contrast: Diverticulitis sigmoid colon in left lower quadrant

on prior exam is nearly completely resolved. No new or acute abnormalities. 

Other findings that need out patient pelvic US. Discussed with patient. 


CT Results Date: 21


CT Results Time: 14:15





- Re-Assessments/Exams


Free Text/Narrative Re-Assessment/Exam: 





21 13:21


Patient did report the Morphine helped some. Ordered Toradol to help pain. 





Departure





- Departure


Time of Disposition: 14:19


Disposition: Home, Self-Care 01


Condition: Fair


Clinical Impression: 


Abdominal pain


Qualifiers:


 Abdominal location: right lower quadrant Qualified Code(s): R10.31 - Right 

lower quadrant pain








- Discharge Information


*PRESCRIPTION DRUG MONITORING PROGRAM REVIEWED*: Yes


*COPY OF PRESCRIPTION DRUG MONITORING REPORT IN PATIENT MYRIAM: No


Instructions:  Abdominal Pain, Adult, Easy-to-Read


Referrals: 


Nimo Wood NP [Primary Care Provider] - 


Forms:  ED Department Discharge


Additional Instructions: 


Followup with your primary care provider


Out patient pelvis ultrasound to be done


Return to the ER for worsening of condition or any emergent concerns such as 

increase in pain, vomiting, fever


Continue previously prescribed antibiotic


Toradol 10mg 1 pill every 6 hours as needed for pain #12 no refill


Norco 5/325mg 1 pill every 6 hours as needed for pain #6 no refill take home: If

Toradol is not helping





Sepsis Event Note (ED)





- Evaluation


Sepsis Screening Result: No Definite Risk





- Focused Exam


Vital Signs: 


                                   Vital Signs











  Temp Pulse Resp BP Pulse Ox


 


 02/13/21 12:13  98.2 F  89  18  109/85  98














- My Orders


Last 24 Hours: 


My Active Orders





21 12:41


Abdomen Pelvis w Cont [CT] Stat 














- Assessment/Plan


Last 24 Hours: 


My Active Orders





21 12:41


Abdomen Pelvis w Cont [CT] Stat 











Plan: 





PLEASE SEE RN NOTE FOR PFSH

## 2021-08-04 ENCOUNTER — HOSPITAL ENCOUNTER (EMERGENCY)
Dept: HOSPITAL 38 - CC.ED | Age: 46
Discharge: HOME | End: 2021-08-04
Payer: MEDICAID

## 2021-08-04 DIAGNOSIS — M79.89: Primary | ICD-10-CM

## 2021-08-04 DIAGNOSIS — R21: ICD-10-CM

## 2021-08-04 DIAGNOSIS — Z72.0: ICD-10-CM

## 2021-08-04 DIAGNOSIS — E78.00: ICD-10-CM

## 2021-08-04 DIAGNOSIS — Z79.899: ICD-10-CM

## 2021-08-04 DIAGNOSIS — T42.6X5A: ICD-10-CM

## 2021-08-04 DIAGNOSIS — Z88.8: ICD-10-CM

## 2021-08-04 DIAGNOSIS — Z88.2: ICD-10-CM

## 2021-08-04 RX ADMIN — METHYLPREDNISOLONE ACETATE ONE MG: 80 INJECTION, SUSPENSION INTRA-ARTICULAR; INTRALESIONAL; INTRAMUSCULAR; SOFT TISSUE at 19:10

## 2021-08-04 NOTE — EDM.PDOC
ED HPI GENERAL MEDICAL PROBLEM





- General


Chief Complaint: Allergic Reaction


Stated Complaint: rash


Time Seen by Provider: 21 18:55


Source of Information: Reports: Patient


History Limitations: Reports: No Limitations





- History of Present Illness


INITIAL COMMENTS - FREE TEXT/NARRATIVE: 





States that she was started on gabapentin 4 days ago by her pain Dr.  She noted 

that she started having swelling to both lower legs which she did not have prior

to starting this.  Today she started to have rash on both arms that are now 

welts and she itches to the areas.  Has not noted any rash to trunk.  She states

that it has been getting worse as the day goes on.  Denies any SOB or difficulty

swallowing.


Onset: Gradual


Location: Reports: Upper Extremity, Left, Upper Extremity, Right, Lower 

Extremity, Left, Lower Extremity, Right


  ** Bilateral Feet


Pain Score (Numeric/FACES): 5





- Related Data


                                    Allergies











Allergy/AdvReac Type Severity Reaction Status Date / Time


 


gabapentin Allergy  Rash Verified 21 19:06


 


sulfamethoxazole Allergy  Renal Verified 21 18:41





[From Bactrim]   Insufficiency  


 


trimethoprim [From Bactrim] Allergy  Renal Verified 21 18:41





   Insufficiency  











Home Meds: 


                                    Home Meds





Cyclobenzaprine HCl 10 mg PO BID 19 [History]


Topiramate [Topamax] 100 mg PO DAILY 19 [History]


atorvaSTATin Calcium [Atorvastatin Calcium] 40 mg PO DAILY 19 [History]


busPIRone HCl [Buspirone HCl] 15 mg PO DAILY 19 [History]


Propranolol [Inderal] 80 mg PO BID 21 [History]


SUMAtriptan [Imitrex] 100 mg PO ASDIRECTED PRN 21 [History]


Gabapentin [Neurontin] 200 mg PO DAILY 21 [History]


Varenicline Tartrate [Chantix] 2 cap PO DAILY 21 [History]











Past Medical History


HEENT History: Reports: None


Cardiovascular History: Reports: None, High Cholesterol


Respiratory History: Reports: Other (See Below)


Other Respiratory History: SEASONAL ALLERGIES


Gastrointestinal History: Reports: Other (See Below)


Other Gastrointestinal History: UMBILICAL HERNIA


Genitourinary History: Reports: None


OB/GYN History: Reports: Polycystic Ovaries, Pregnancy


Musculoskeletal History: Reports: Back Pain, Chronic


Neurological History: Reports: Migraines


Psychiatric History: Reports: Anxiety, Depression


Dermatologic History: Reports: None





- Infectious Disease History


Infectious Disease History: Reports: Chicken Pox





- Past Surgical History


HEENT Surgical History: Reports: None


Cardiovascular Surgical History: Reports: None


Respiratory Surgical History: Reports: None


GI Surgical History: Reports: Cholecystectomy, Hernia Repair/Other


Female  Surgical History: Reports:  Section, Hysterectomy


Neurological Surgical History: Reports: None


Musculoskeletal Surgical History: Reports: None





Social & Family History





- Family History


Family Medical History: No Pertinent Family History





- Tobacco Use


Tobacco Use Status *Q: Current Every Day Tobacco User


Years of Tobacco use: 30


Packs/Tins Daily: 0.5





- Caffeine Use


Caffeine Use: Reports: Soda





- Recreational Drug Use


Recreational Drug Use: No





ED ROS ALLERGIC REACTION





- Review of Systems


Review Of Systems: See Below


Constitutional: Denies: Fever, Chills


HEENT: Denies: Throat Swelling


Respiratory: Denies: Shortness of Breath, Wheezing


Cardiovascular: Reports: No Symptoms


Skin: Reports: Pruritis, Rash





ED EXAM GENERAL NO PERIP PULSE





- Physical Exam


Exam: See Below


Exam Limited By: No Limitations


General Appearance: Alert, WD/WN, Mild Distress


Throat/Mouth: Normal Inspection, Normal Oropharynx, Normal Voice, No Airway 

Compromise


Head: Atraumatic, Normocephalic


Neck: Normal Inspection, Supple


Respiratory/Chest: No Respiratory Distress, Lungs Clear, Normal Breath Sounds.  

No: Wheezing


Cardiovascular: Regular Rate, Rhythm


Extremities: Pedal Edema (2+ edema noted bilaterally.  Non pitting.  from mid 

shin to ankles.)


Neurological: Alert, Oriented


Skin Exam: Warm, Dry, Rash (Has red uri rash to both of her arms.  Legs have 

fine rash bilaterally.  No open areas.  NO rash noted to trunk.)





Course





- Vital Signs


Last Recorded V/S: 


                                Last Vital Signs











Temp  97.9 F   21 18:46


 


Pulse  79   21 18:46


 


Resp  16   21 18:46


 


BP  135/61   21 18:46


 


Pulse Ox  98   21 18:46














- Orders/Labs/Meds


Meds: 


Medications














Discontinued Medications














Generic Name Dose Route Start Last Admin





  Trade Name Freq  PRN Reason Stop Dose Admin


 


Methylprednisolone Acetate  80 mg  21 19:04  21 19:10





  Methylprednisolone Acetate 80 Mg/Ml Sdv  IM  21 19:05  80 mg





  ONETIME ONE   Administration














Departure





- Departure


Time of Disposition: 19:15


Disposition: Home, Self-Care 01


Condition: Good


Clinical Impression: 


Allergic reaction caused by a drug


Qualifiers:


 Encounter type: initial encounter Qualified Code(s): T78.40XA - Allergy, 

unspecified, initial encounter








- Discharge Information


*PRESCRIPTION DRUG MONITORING PROGRAM REVIEWED*: Not Applicable


*COPY OF PRESCRIPTION DRUG MONITORING REPORT IN PATIENT MYRIAM: Not Applicable


Instructions:  Drug Rash


Forms:  ED Department Discharge


Additional Instructions: 


Benadryl orally or topically as needed for discomfort


elevate legs for the swelling


call provider who started you on the gabapentin and as for alternate meds.


Recheck if rash does not improve.





Sepsis Event Note (ED)





- Evaluation


Sepsis Screening Result: No Definite Risk





- Focused Exam


Vital Signs: 


                                   Vital Signs











  Temp Pulse Resp BP Pulse Ox


 


 21 18:46  97.9 F  79  16  135/61  98














- Problem List & Annotations


(1) Allergic reaction caused by a drug


SNOMED Code(s): 276425389


   Code(s): T78.40XA - ALLERGY, UNSPECIFIED, INITIAL ENCOUNTER   Status: Acute  

 Priority: High   


Qualifiers: 


   Encounter type: initial encounter   Qualified Code(s): T78.40XA - Allergy, 

unspecified, initial encounter   





- Problem List Review


Problem List Initiated/Reviewed/Updated: Yes

## 2021-11-13 ENCOUNTER — HOSPITAL ENCOUNTER (EMERGENCY)
Dept: HOSPITAL 38 - CC.ED | Age: 46
Discharge: NURSING, REHAB OR LONG-TERM CARE | End: 2021-11-13
Payer: MEDICAID

## 2021-11-13 DIAGNOSIS — Z79.82: ICD-10-CM

## 2021-11-13 DIAGNOSIS — Z88.2: ICD-10-CM

## 2021-11-13 DIAGNOSIS — Z88.6: ICD-10-CM

## 2021-11-13 DIAGNOSIS — I21.4: Primary | ICD-10-CM

## 2021-11-13 DIAGNOSIS — Z79.899: ICD-10-CM

## 2021-11-13 DIAGNOSIS — Z88.1: ICD-10-CM

## 2021-11-13 DIAGNOSIS — Z20.822: ICD-10-CM

## 2021-11-13 DIAGNOSIS — Z87.891: ICD-10-CM

## 2021-11-13 DIAGNOSIS — E78.00: ICD-10-CM

## 2021-11-13 LAB
APTT PPP: 23.2 SEC (ref 23.2–32.3)
CHLORIDE SERPL-SCNC: 105 MEQ/L (ref 98–106)
SODIUM SERPL-SCNC: 139 MEQ/L (ref 136–145)

## 2021-11-13 PROCEDURE — U0002 COVID-19 LAB TEST NON-CDC: HCPCS

## 2021-11-13 NOTE — EDM.PDOC
ED HPI GENERAL MEDICAL PROBLEM





- General


Chief Complaint: Chest Pain


Stated Complaint: chest pain


Time Seen by Provider: 21 11:25


Source of Information: Reports: Patient


History Limitations: Reports: No Limitations





- History of Present Illness


INITIAL COMMENTS - FREE TEXT/NARRATIVE: 





States that she woke up this AM and developed chest pain that radiated into the 

left arm.  She felt a little" short of breathe" but not diaphoretic, nausea or 

weak.  The pain did not subside so she called ambulance.  She states that she 

was in to [place] [facility] last week for similar event and was told that she was 

having muscle spasm.  Last evening she ate fried chicken.  Did not have 

breakfast this AM.  Did not do anything strenuous yesterday at work.  Pain did 

improve when given ntg by ambulance crew.  When arriving here she did receive 

second dose of ntg which relieved the pain entirely.  ASA was given.  No SOB 

noted.


Onset: Sudden


Location: Reports: Chest


Associated Symptoms: Reports: Chest Pain, Shortness of Breath.  Denies: Cough


Treatments PTA: Reports: Nitroglycerin


  ** Left Chest


Pain Score (Numeric/FACES): 7





- Related Data


                                    Allergies











Allergy/AdvReac Type Severity Reaction Status Date / Time


 


gabapentin Allergy  Rash Verified 21 11:41


 


sulfamethoxazole Allergy  Renal Verified 21 11:41





[From Bactrim]   Insufficiency  


 


trimethoprim [From Bactrim] Allergy  Renal Verified 21 11:41





   Insufficiency  











Home Meds: 


                                    Home Meds





Cyclobenzaprine HCl 10 mg PO BID 19 [History]


Topiramate [Topamax] 100 mg PO DAILY 19 [History]


atorvaSTATin Calcium [Atorvastatin Calcium] 40 mg PO DAILY 19 [History]


busPIRone HCl [Buspirone HCl] 15 mg PO DAILY 19 [History]


Propranolol [Inderal] 80 mg PO BID 21 [History]


SUMAtriptan [Imitrex] 100 mg PO ASDIRECTED PRN 21 [History]


Aspirin 81 mg PO DAILY 21 [History]


Iron,Carbonyl/Ascorbic Acid [Iron 100-Vitamin C Tablet] 1 tab PO DAILY 21 

[History]











Past Medical History


HEENT History: Reports: None


Cardiovascular History: Reports: None, High Cholesterol


Respiratory History: Reports: Other (See Below)


Other Respiratory History: SEASONAL ALLERGIES


Gastrointestinal History: Reports: Other (See Below)


Other Gastrointestinal History: UMBILICAL HERNIA


Genitourinary History: Reports: None


OB/GYN History: Reports: Polycystic Ovaries, Pregnancy


Musculoskeletal History: Reports: Back Pain, Chronic


Neurological History: Reports: Migraines


Psychiatric History: Reports: Anxiety, Depression


Dermatologic History: Reports: None





- Infectious Disease History


Infectious Disease History: Reports: Chicken Pox





- Past Surgical History


HEENT Surgical History: Reports: None


Cardiovascular Surgical History: Reports: None


Respiratory Surgical History: Reports: None


GI Surgical History: Reports: Cholecystectomy, Hernia Repair/Other


Female  Surgical History: Reports:  Section, Hysterectomy


Neurological Surgical History: Reports: None


Musculoskeletal Surgical History: Reports: None





Social & Family History





- Family History


Family Medical History: No Pertinent Family History





- Tobacco Use


Tobacco Use Status *Q: Former Tobacco User


Used Tobacco, but Quit: Yes


Month/Year Tobacco Last Used: 10/21





- Caffeine Use


Caffeine Use: Reports: Energy Drinks





- Recreational Drug Use


Recreational Drug Use: No





ED ROS GENERAL





- Review of Systems


Review Of Systems: See Below


Constitutional: Denies: Fever, Chills, Weakness


Respiratory: Reports: Shortness of Breath


Cardiovascular: Reports: Chest Pain


Endocrine: Reports: No Symptoms


GI/Abdominal: Denies: Abdominal Pain, Constipation, Diarrhea


: Reports: No Symptoms


Neurological: Reports: No Symptoms





ED EXAM, GENERAL





- Physical Exam


Exam: See Below


Exam Limited By: No Limitations


General Appearance: Alert, WD/WN


Ears: Normal External Exam, Normal Canal, Normal TMs


Throat/Mouth: Normal Oropharynx


Head: Atraumatic


Neck: Normal Inspection


Respiratory/Chest: No Respiratory Distress, Lungs Clear, Normal Breath Sounds


Cardiovascular: Normal Peripheral Pulses, Regular Rate, Rhythm, No Edema


GI/Abdominal: Normal Bowel Sounds, Soft, Non-Tender


Extremities: Normal Inspection, No Pedal Edema, Normal Capillary Refill


Neurological: Alert, Oriented, CN II-XII Intact


Skin Exam: Warm, Dry, Intact





Course





- Vital Signs


Last Recorded V/S: 


                                Last Vital Signs











Temp  97.9 F   21 12:28


 


Pulse  73   21 12:28


 


Resp  16   21 12:28


 


BP  113/81   21 12:28


 


Pulse Ox  99   21 12:28














- Orders/Labs/Meds


Orders: 


                               Active Orders 24 hr











 Category Date Time Status


 


 Chest 2V [CR] Stat Exams  21 11:25 Ordered


 


 CORONAVIRUS COVID-19 RAPID [MOLEC] Stat Lab  21 12:37 Ordered


 


 CORONAVIRUS COVID-19 RAPID [MOLEC] Stat Lab  21 12:45 Ordered


 


 Nitroglycerin [Nitrostat] Med  21 11:22 Active





 0.4 mg SL Q5M PRN   








                                Medication Orders





Nitroglycerin (Nitroglycerin 0.4 Mg Tab.Sl)  0.4 mg SL Q5M PRN


   PRN Reason: Chest Pain


   Last Admin: 21 11:30  Dose: 0.4 mg


   Documented by: JAIRO








Labs: 


                                Laboratory Tests











  21 Range/Units





  11:25 11:25 11:25 


 


WBC  9.6    (4.0-11.0)  10^3/uL


 


RBC  4.77    (4.00-5.50)  x10^6/uL


 


Hgb  14.2    (12.0-16.0)  g/dL


 


Hct  43.8    (37.0-47.0)  %


 


MCV  91.8    (83.0-97.0)  fL


 


MCH  29.8    (27.0-32.0)  pg


 


MCHC  32.4    (32.0-36.0)  g/dL


 


RDW Coeff of Jocy  14.7    (11.0-15.0)  %


 


Plt Count  267    (150-400)  10^3/uL


 


MPV  10.2    fL


 


PT   9.3 L   (9.7-12.3)  SEC


 


INR   0.84 L   (0.92-1.18)  


 


APTT   23.2   (23.2-32.3)  SEC


 


Sodium    139  (136-145)  mEq/L


 


Potassium    4.1  (3.5-5.0)  mEq/L


 


Chloride    105  ()  mEq/L


 


Carbon Dioxide    23  (21-32)  mmol/L


 


BUN    12  (7-18)  mg/dL


 


Creatinine    0.9  (0.6-1.0)  mg/dL


 


Est Cr Clr Drug Dosing    77.11  mL/min


 


Estimated GFR (MDRD)    > 60  (>=60)  mL/min


 


Glucose    132 H D  (75-99)  mg/dL


 


Calcium    9.5  (8.4-10.1)  mg/dL


 


Magnesium    1.9  (1.8-2.4)  mg/dL


 


Total Bilirubin    0.3  (0.0-1.0)  mg/dL


 


AST    16  (15-37)  U/L


 


ALT    22  (12-78)  U/L


 


Alkaline Phosphatase    120 H  ()  U/L


 


Lactate Dehydrogenase    163  (100-190)  U/L


 


Creatine Kinase    54  ()  U/L


 


Troponin I High Sens    97.7 H*  (<=51)  pg/mL


 


Total Protein    7.0  (6.4-8.2)  g/dL


 


Albumin    3.2 L  (3.4-5.0)  g/dL


 


Lipase    131  ()  U/L











Meds: 


Medications











Generic Name Dose Route Start Last Admin





  Trade Name Freq  PRN Reason Stop Dose Admin


 


Nitroglycerin  0.4 mg  21 11:22  21 11:30





  Nitroglycerin 0.4 Mg Tab.Sl  SL   0.4 mg





  Q5M PRN   Administration





  Chest Pain  














Discontinued Medications














Generic Name Dose Route Start Last Admin





  Trade Name Freq  PRN Reason Stop Dose Admin


 


Aspirin  324 mg  21 11:29  21 11:29





  Aspirin 81 Mg Tab.Chew  PO  21 11:30  324 mg





  ONETIME ONE   Administration


 


Enoxaparin Sodium  100 mg  21 12:45 





  Enoxaparin 100 Mg/1 Ml Syringe  SUBCUT  21 12:46 





  NOW STA  














- Re-Assessments/Exams


Free Text/Narrative Re-Assessment/Exam: 





21 12:30 Discussed pt with Dr. Rivera at [facility].  He 

will accept pt in transfer.  This is the closest facility that has a bed 

available for MI pt.





risks of transfer discussed with pt include  MVC enroute. worsening of 

condition.   Benefits of transfer include specialized care, and cardiology.  

Risk of non-transfer include no specialist, worsening of condition.  Benefits of

 non transfer would include close to family.  Pt understands and agrees to 

transfer.








Pt has had some intermittent pain that lasts for 5-10 seconds and then relieves 

itself without further meds.





Departure





- Departure


Time of Disposition: 13:00


Disposition: DC/Tfer to Acute Hospital 02


Reason for Transfer *Q: Primary PCI Indicated


Condition: Fair


Clinical Impression: 


Acute myocardial infarction


Qualifiers:


 Myocardial infarction type: non-ST elevation myocardial infarction Qualified 

Code(s): I21.4 - Non-ST elevation (NSTEMI) myocardial infarction





Forms:  ED Department Discharge





Sepsis Event Note (ED)





- Evaluation


Sepsis Screening Result: No Definite Risk





- Focused Exam


Vital Signs: 


                                   Vital Signs











  Temp Pulse Pulse Resp BP BP Pulse Ox


 


 21 12:28  97.9 F   73  16   113/81  99


 


 21 12:00  97.9 F   73  16   123/81  99


 


 21 11:45  97.9 F   76  18   116/86  100


 


 21 11:35  97.8 F   77  18   121/69  97


 


 21 11:30   79    121/76  


 


 21 11:14  97.2 F   79  18   121/76  97














- Problem List & Annotations


(1) Acute myocardial infarction


SNOMED Code(s): 46790475


   Code(s): I21.9 - ACUTE MYOCARDIAL INFARCTION, UNSPECIFIED   Status: Acute   

Current Visit: Yes   


Qualifiers: 


   Myocardial infarction type: non-ST elevation myocardial infarction   

Qualified Code(s): I21.4 - Non-ST elevation (NSTEMI) myocardial infarction   





- Problem List Review


Problem List Initiated/Reviewed/Updated: Yes





- My Orders


Last 24 Hours: 


My Active Orders





21 11:22


Nitroglycerin [Nitrostat]   0.4 mg SL Q5M PRN 





21 11:25


Chest 2V [CR] Stat 





21 12:37


CORONAVIRUS COVID-19 RAPID [MOLEC] Stat 





21 12:45


CORONAVIRUS COVID-19 RAPID [MOLEC] Stat 














- Assessment/Plan


Last 24 Hours: 


My Active Orders





21 11:22


Nitroglycerin [Nitrostat]   0.4 mg SL Q5M PRN 





21 11:25


Chest 2V [CR] Stat 





21 12:37


CORONAVIRUS COVID-19 RAPID [MOLEC] Stat 





21 12:45


CORONAVIRUS COVID-19 RAPID [MOLEC] Stat

## 2022-10-17 NOTE — EDM.PDOC
ED HPI GENERAL MEDICAL PROBLEM





- General


Chief Complaint: Back Pain or Injury


Stated Complaint: FALL, NECK, BACK PAIN


Time Seen by Provider: 04/06/19 14:53


Source of Information: Reports: Patient, EMS


History Limitations: Reports: No Limitations





- History of Present Illness


Onset: Today


Onset Date: 04/06/19


Onset Time: 02:00


Location: Reports: Back


Quality: Reports: Ache, Sharp, Throbbing


Severity: Moderate


Improves with: Reports: None


Worsens with: Reports: Movement


Associated Symptoms: Reports: No Other Symptoms


  ** Posterior Neck


Pain Score (Numeric/FACES): 8





- Related Data


 Allergies











Allergy/AdvReac Type Severity Reaction Status Date / Time


 


sulfamethoxazole Allergy  Renal Verified 04/06/19 14:49





[From Bactrim]   Insufficiency  


 


trimethoprim [From Bactrim] Allergy  Renal Verified 04/06/19 14:49





   Insufficiency  











Home Meds: 


 Home Meds





Devil's Claw Extract [Devil's Claw] 10 mg PO BID 04/06/19 [History]











ED ROS GENERAL





- Review of Systems


Review Of Systems: See Below


Constitutional: Reports: No Symptoms


HEENT: Reports: No Symptoms


Respiratory: Reports: No Symptoms


Cardiovascular: Reports: No Symptoms


Endocrine: Reports: No Symptoms


GI/Abdominal: Reports: No Symptoms


Musculoskeletal: Reports: Neck Pain, Back Pain, Joint Pain (right shoulder)


Skin: Reports: No Symptoms


Neurological: Reports: No Symptoms


Psychiatric: Reports: No Symptoms


Hematologic/Lymphatic: Reports: No Symptoms





ED EXAM, UPPER BACK/NECK PAIN





- Physical Exam


Exam: See Below


Exam Limited By: No Limitations


General Appearance: Alert, WD/WN, No Apparent Distress


Eye Exam: Bilateral Eye: EOMI, PERRL


Ears Exam: Normal External Exam, Normal Canal, Hearing Grossly Normal, Normal 

TMs


Nose Exam: Normal Inspection, Normal Mucousa


Throat/Mouth Exam: Normal Inspection


Head Exam: Atraumatic, Normocephalic


Neck Exam: Full Range of Motion, Normal Alignment, Normal Inspection, 

Paraspinous Muscle Tender


Nexus Criteria: No: Posterior, Midline Cervical Tenderness, Evidence of 

Intoxication, Altered Level of Consciousness, Focal Neurological Deficit, 

Painful Distraction Injuries


Cardiovascular/Respiratory: Regular Rate, Rhythm, No M/R/G, Normal Peripheral 

Pulses, No JVD, Normal Breath Sounds, No Respiratory Distress


GI/Abdominal: Normal Bowel Sounds, Soft, Non-Tender


Back Exam: Paraspinal Tenderness (thorasic and cervical spine)


Extremities: Normal Inspection, Normal Range of Motion, Normal Capillary Refill

, Other (right shoulder tenderness)


Neurologic: No Motor/Sensory Deficits, Alert, Normal Mood/Affect, Oriented x 3


DTR: 4+: Patella (R), Patella (L), Achilles (R), Achilles (L)


Psychiatric: Normal Affect, Normal Mood


Skin Exam: Normal Color, Warm/Dry





Course





- Vital Signs


Last Recorded V/S: 


 Last Vital Signs











Temp  97.1 F   04/06/19 14:46


 


Pulse  101 H  04/06/19 14:46


 


Resp  16   04/06/19 14:46


 


BP  124/85   04/06/19 14:46


 


Pulse Ox  96   04/06/19 14:46














- Orders/Labs/Meds


Orders: 


 Active Orders 24 hr











 Category Date Time Status


 


 Cervical Spine 2V or 3V [CR] Stat Exams  04/06/19 15:07 Taken


 


 Shoulder Comp Lt [CR] Stat Exams  04/06/19 15:08 Taken


 


 Thoracic Spine 2V [CR] Stat Exams  04/06/19 15:08 Taken











Meds: 


Medications














Discontinued Medications














Generic Name Dose Route Start Last Admin





  Trade Name Alex  PRN Reason Stop Dose Admin


 


Morphine Sulfate  4 mg  04/06/19 15:09  04/06/19 15:18





  Morphine  SUBCUT  04/06/19 15:10  4 mg





  ONETIME ONE   Administration





     





     





     





     


 


Ondansetron HCl  4 mg  04/06/19 15:09  04/06/19 15:18





  Zofran Odt  PO  04/06/19 15:10  4 mg





  ONETIME ONE   Administration





     





     





     





     














- Radiology Interpretation


Free Text/Narrative:: 





Xrays of the shoulder and the t-spine all negative per radiology reports. 

patient made aware of the findings. 





Departure





- Departure


Time of Disposition: 16:55


Disposition: Home, Self-Care 01


Condition: Good


Clinical Impression: 


 Thoracic sprain, Shoulder sprain





Fall


Qualifiers:


 Encounter type: initial encounter Qualified Code(s): W19.XXXA - Unspecified 

fall, initial encounter








- Discharge Information


*PRESCRIPTION DRUG MONITORING PROGRAM REVIEWED*: Not Applicable


*COPY OF PRESCRIPTION DRUG MONITORING REPORT IN PATIENT MYRIAM: Not Applicable


Instructions:  Muscle Strain, Easy-to-Read, Back Exercises, Easy-to-Read, 

Shoulder Sprain, Pain Medicine Instructions, Easy-to-Read


Referrals: 


Siena Lacy PA-C [ED Midlevel Provider] - 


Forms:  ED Department Discharge


Additional Instructions: 


Use Ice to the sore area for 3 days then heat. 





Use back pain exercises. 





Use Norco for pain do not drive or mix with alcohol. 





Use Motrin 600mg every -8 hours for pain with the norco.





See Siena VILLA for follow up if still painful in 7-10days





work note for tomorrow





Return if worse. 





- My Orders


Last 24 Hours: 


My Active Orders





04/06/19 15:07


Cervical Spine 2V or 3V [CR] Stat 





04/06/19 15:08


Shoulder Comp Lt [CR] Stat 


Thoracic Spine 2V [CR] Stat 














- Assessment/Plan


Last 24 Hours: 


My Active Orders





04/06/19 15:07


Cervical Spine 2V or 3V [CR] Stat 





04/06/19 15:08


Shoulder Comp Lt [CR] Stat 


Thoracic Spine 2V [CR] Stat 











Plan: 





Patient given morphine SQ in ER controlled pain


Told of negative xrays


and work noted given for tomorrow. 


Told to f/u with siena VILLA for pain greater than 10 days. PDMP reviewed. No abberant behavior. Prescription sent to pharmacy.